# Patient Record
Sex: FEMALE | Race: WHITE
[De-identification: names, ages, dates, MRNs, and addresses within clinical notes are randomized per-mention and may not be internally consistent; named-entity substitution may affect disease eponyms.]

---

## 2018-02-22 ENCOUNTER — HOSPITAL ENCOUNTER (EMERGENCY)
Dept: HOSPITAL 17 - NEPD | Age: 23
Discharge: HOME | End: 2018-02-22
Payer: COMMERCIAL

## 2018-02-22 VITALS
RESPIRATION RATE: 20 BRPM | DIASTOLIC BLOOD PRESSURE: 74 MMHG | SYSTOLIC BLOOD PRESSURE: 129 MMHG | HEART RATE: 88 BPM | TEMPERATURE: 98.1 F | OXYGEN SATURATION: 99 %

## 2018-02-22 VITALS — DIASTOLIC BLOOD PRESSURE: 76 MMHG | SYSTOLIC BLOOD PRESSURE: 124 MMHG

## 2018-02-22 VITALS
RESPIRATION RATE: 18 BRPM | SYSTOLIC BLOOD PRESSURE: 111 MMHG | OXYGEN SATURATION: 98 % | DIASTOLIC BLOOD PRESSURE: 68 MMHG | HEART RATE: 80 BPM

## 2018-02-22 VITALS — RESPIRATION RATE: 18 BRPM
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
## 2018-02-22 DIAGNOSIS — S39.012A: ICD-10-CM

## 2018-02-22 DIAGNOSIS — S16.1XXA: Primary | ICD-10-CM

## 2018-02-22 DIAGNOSIS — S20.219A: ICD-10-CM
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
## 2018-02-22 PROCEDURE — 72131 CT LUMBAR SPINE W/O DYE: CPT

## 2018-02-22 PROCEDURE — 72125 CT NECK SPINE W/O DYE: CPT

## 2018-02-22 PROCEDURE — 96372 THER/PROPH/DIAG INJ SC/IM: CPT

## 2018-02-22 PROCEDURE — 71045 X-RAY EXAM CHEST 1 VIEW: CPT

## 2018-02-22 PROCEDURE — 99283 EMERGENCY DEPT VISIT LOW MDM: CPT
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
## 2018-03-18 ENCOUNTER — HOSPITAL ENCOUNTER (EMERGENCY)
Dept: HOSPITAL 17 - NEPC | Age: 23
LOS: 1 days | Discharge: HOME | End: 2018-03-19
Payer: COMMERCIAL

## 2018-03-18 VITALS
OXYGEN SATURATION: 100 % | RESPIRATION RATE: 20 BRPM | SYSTOLIC BLOOD PRESSURE: 138 MMHG | HEART RATE: 87 BPM | DIASTOLIC BLOOD PRESSURE: 80 MMHG | TEMPERATURE: 98.1 F

## 2018-03-18 VITALS — HEIGHT: 63 IN | WEIGHT: 150.31 LBS | BODY MASS INDEX: 26.63 KG/M2

## 2018-03-18 DIAGNOSIS — T14.8XXA: ICD-10-CM

## 2018-03-18 DIAGNOSIS — Z88.0: ICD-10-CM

## 2018-03-18 DIAGNOSIS — J45.909: ICD-10-CM

## 2018-03-18 DIAGNOSIS — S80.10XA: Primary | ICD-10-CM

## 2018-03-18 DIAGNOSIS — Z23: ICD-10-CM

## 2018-03-18 DIAGNOSIS — J01.40: ICD-10-CM
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
## 2018-03-18 LAB
BASOPHILS # BLD AUTO: 0.1 TH/MM3 (ref 0–0.2)
BASOPHILS NFR BLD: 0.6 % (ref 0–2)
BUN SERPL-MCNC: 11 MG/DL (ref 7–18)
CALCIUM SERPL-MCNC: 8.9 MG/DL (ref 8.5–10.1)
CHLORIDE SERPL-SCNC: 102 MEQ/L (ref 98–107)
COLOR UR: YELLOW
CREAT SERPL-MCNC: 0.94 MG/DL (ref 0.5–1)
EOSINOPHIL # BLD: 0.5 TH/MM3 (ref 0–0.4)
EOSINOPHIL NFR BLD: 3.9 % (ref 0–4)
ERYTHROCYTE [DISTWIDTH] IN BLOOD BY AUTOMATED COUNT: 16.2 % (ref 11.6–17.2)
GFR SERPLBLD BASED ON 1.73 SQ M-ARVRAT: 76 ML/MIN (ref 89–?)
GLUCOSE SERPL-MCNC: 85 MG/DL (ref 74–106)
GLUCOSE UR STRIP-MCNC: (no result) MG/DL
HCO3 BLD-SCNC: 24.4 MEQ/L (ref 21–32)
HCT VFR BLD CALC: 38.4 % (ref 35–46)
HGB BLD-MCNC: 13.5 GM/DL (ref 11.6–15.3)
HGB UR QL STRIP: (no result)
INR PPP: 1 RATIO
KETONES UR STRIP-MCNC: (no result) MG/DL
LYMPHOCYTES # BLD AUTO: 4.1 TH/MM3 (ref 1–4.8)
LYMPHOCYTES NFR BLD AUTO: 32.8 % (ref 9–44)
MCH RBC QN AUTO: 27.2 PG (ref 27–34)
MCHC RBC AUTO-ENTMCNC: 35.1 % (ref 32–36)
MCV RBC AUTO: 77.4 FL (ref 80–100)
MONOCYTE #: 1 TH/MM3 (ref 0–0.9)
MONOCYTES NFR BLD: 7.9 % (ref 0–8)
NEUTROPHILS # BLD AUTO: 6.8 TH/MM3 (ref 1.8–7.7)
NEUTROPHILS NFR BLD AUTO: 54.8 % (ref 16–70)
NITRITE UR QL STRIP: (no result)
PLATELET # BLD: 200 TH/MM3 (ref 150–450)
PMV BLD AUTO: 8.8 FL (ref 7–11)
PROTHROMBIN TIME: 10.4 SEC (ref 9.8–11.6)
RBC # BLD AUTO: 4.96 MIL/MM3 (ref 4–5.3)
SODIUM SERPL-SCNC: 138 MEQ/L (ref 136–145)
SP GR UR STRIP: 1.01 (ref 1–1.03)
SQUAMOUS #/AREA URNS HPF: 5 /HPF (ref 0–5)
URINE LEUKOCYTE ESTERASE: (no result)
WBC # BLD AUTO: 12.4 TH/MM3 (ref 4–11)

## 2018-03-18 PROCEDURE — 72125 CT NECK SPINE W/O DYE: CPT

## 2018-03-18 PROCEDURE — 80307 DRUG TEST PRSMV CHEM ANLYZR: CPT

## 2018-03-18 PROCEDURE — 96361 HYDRATE IV INFUSION ADD-ON: CPT

## 2018-03-18 PROCEDURE — 74177 CT ABD & PELVIS W/CONTRAST: CPT

## 2018-03-18 PROCEDURE — 80048 BASIC METABOLIC PNL TOTAL CA: CPT

## 2018-03-18 PROCEDURE — 90714 TD VACC NO PRESV 7 YRS+ IM: CPT

## 2018-03-18 PROCEDURE — 86900 BLOOD TYPING SEROLOGIC ABO: CPT

## 2018-03-18 PROCEDURE — 85025 COMPLETE CBC W/AUTO DIFF WBC: CPT

## 2018-03-18 PROCEDURE — 99284 EMERGENCY DEPT VISIT MOD MDM: CPT

## 2018-03-18 PROCEDURE — 85610 PROTHROMBIN TIME: CPT

## 2018-03-18 PROCEDURE — 70450 CT HEAD/BRAIN W/O DYE: CPT

## 2018-03-18 PROCEDURE — 96374 THER/PROPH/DIAG INJ IV PUSH: CPT

## 2018-03-18 PROCEDURE — 86901 BLOOD TYPING SEROLOGIC RH(D): CPT

## 2018-03-18 PROCEDURE — 86850 RBC ANTIBODY SCREEN: CPT

## 2018-03-18 PROCEDURE — 81001 URINALYSIS AUTO W/SCOPE: CPT

## 2018-03-18 PROCEDURE — 70486 CT MAXILLOFACIAL W/O DYE: CPT

## 2018-03-18 PROCEDURE — 90471 IMMUNIZATION ADMIN: CPT

## 2018-03-18 PROCEDURE — 84702 CHORIONIC GONADOTROPIN TEST: CPT

## 2018-03-18 PROCEDURE — 71260 CT THORAX DX C+: CPT
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
